# Patient Record
Sex: FEMALE | Race: WHITE | URBAN - METROPOLITAN AREA
[De-identification: names, ages, dates, MRNs, and addresses within clinical notes are randomized per-mention and may not be internally consistent; named-entity substitution may affect disease eponyms.]

---

## 2017-01-01 ENCOUNTER — HOSPITAL ENCOUNTER (OUTPATIENT)
Dept: CARDIAC CATH/INVASIVE PROCEDURES | Age: 82
Discharge: HOME | End: 2017-11-16
Attending: UROLOGY | Admitting: UROLOGY

## 2017-01-01 ENCOUNTER — TELEPHONE (OUTPATIENT)
Dept: ORTHOPEDIC SURGERY | Age: 82
End: 2017-01-01

## 2017-01-01 ENCOUNTER — OFFICE VISIT (OUTPATIENT)
Dept: ORTHOPEDIC SURGERY | Age: 82
End: 2017-01-01

## 2017-01-01 ENCOUNTER — HOSPITAL ENCOUNTER (OUTPATIENT)
Dept: INTERVENTIONAL RADIOLOGY/VASCULAR | Age: 82
Discharge: HOME | End: 2017-07-28
Attending: OBSTETRICS & GYNECOLOGY | Admitting: OBSTETRICS & GYNECOLOGY

## 2017-01-01 ENCOUNTER — HOSPITAL ENCOUNTER (OUTPATIENT)
Dept: CARDIAC CATH/INVASIVE PROCEDURES | Age: 82
Discharge: HOME | End: 2017-09-22
Attending: UROLOGY | Admitting: UROLOGY

## 2017-01-01 ENCOUNTER — HOSPITAL ENCOUNTER (OUTPATIENT)
Dept: CT IMAGING | Age: 82
Discharge: HOME | End: 2017-07-13
Attending: OBSTETRICS & GYNECOLOGY | Admitting: OBSTETRICS & GYNECOLOGY

## 2017-01-01 ENCOUNTER — PRE-PROCEDURE TELEPHONE (OUTPATIENT)
Dept: INTERVENTIONAL RADIOLOGY/VASCULAR | Age: 82
End: 2017-01-01

## 2017-01-01 ENCOUNTER — HOSPITAL ENCOUNTER (OUTPATIENT)
Dept: CARDIAC CATH/INVASIVE PROCEDURES | Age: 82
Discharge: HOME | End: 2017-11-20
Admitting: UROLOGY

## 2017-01-01 ENCOUNTER — HOSPITAL ENCOUNTER (OUTPATIENT)
Dept: SURGERY | Age: 82
Discharge: HOME | End: 2017-11-20
Attending: RADIOLOGY | Admitting: RADIOLOGY

## 2017-01-01 ENCOUNTER — HOSPITAL ENCOUNTER (OUTPATIENT)
Dept: SURGERY | Age: 82
Discharge: HOME | End: 2017-11-10
Attending: UROLOGY | Admitting: UROLOGY

## 2017-01-01 ENCOUNTER — HOSPITAL ENCOUNTER (OUTPATIENT)
Dept: CARDIAC CATH/INVASIVE PROCEDURES | Age: 82
Discharge: HOME | End: 2017-09-26
Attending: RADIOLOGY | Admitting: RADIOLOGY

## 2017-01-01 VITALS
TEMPERATURE: 98.2 F | WEIGHT: 100 LBS | HEIGHT: 60 IN | SYSTOLIC BLOOD PRESSURE: 151 MMHG | HEART RATE: 72 BPM | BODY MASS INDEX: 19.63 KG/M2 | RESPIRATION RATE: 14 BRPM | OXYGEN SATURATION: 100 % | DIASTOLIC BLOOD PRESSURE: 72 MMHG

## 2017-01-01 VITALS — WEIGHT: 100 LBS | HEIGHT: 60 IN | BODY MASS INDEX: 19.63 KG/M2 | TEMPERATURE: 97.6 F

## 2017-01-01 VITALS
SYSTOLIC BLOOD PRESSURE: 150 MMHG | WEIGHT: 100 LBS | HEART RATE: 65 BPM | HEIGHT: 60 IN | DIASTOLIC BLOOD PRESSURE: 75 MMHG | BODY MASS INDEX: 19.63 KG/M2 | OXYGEN SATURATION: 98 % | RESPIRATION RATE: 16 BRPM | TEMPERATURE: 98.1 F

## 2017-01-01 VITALS — WEIGHT: 100 LBS | BODY MASS INDEX: 19.63 KG/M2 | HEIGHT: 60 IN

## 2017-01-01 VITALS — HEIGHT: 60 IN | WEIGHT: 100 LBS | BODY MASS INDEX: 19.63 KG/M2 | TEMPERATURE: 98 F

## 2017-01-01 VITALS
BODY MASS INDEX: 19.63 KG/M2 | DIASTOLIC BLOOD PRESSURE: 75 MMHG | WEIGHT: 100 LBS | SYSTOLIC BLOOD PRESSURE: 119 MMHG | HEIGHT: 60 IN

## 2017-01-01 DIAGNOSIS — Q62.11 HYDRONEPHROSIS WITH URETEROPELVIC JUNCTION (UPJ) OBSTRUCTION: ICD-10-CM

## 2017-01-01 DIAGNOSIS — N13.5 URETERAL OBSTRUCTION: ICD-10-CM

## 2017-01-01 DIAGNOSIS — M79.661 PAIN OF RIGHT LOWER LEG: ICD-10-CM

## 2017-01-01 DIAGNOSIS — M84.361A STRESS FRACTURE OF RIGHT TIBIA, INITIAL ENCOUNTER: Primary | ICD-10-CM

## 2017-01-01 DIAGNOSIS — C67.9 HYDRONEPHROSIS DUE TO OBSTRUCTIVE MALIGNANT BLADDER CANCER (HCC): ICD-10-CM

## 2017-01-01 DIAGNOSIS — R93.89 ABNORMAL X-RAY EXAMINATION: ICD-10-CM

## 2017-01-01 DIAGNOSIS — N13.30 HYDRONEPHROSIS, UNSPECIFIED HYDRONEPHROSIS TYPE: ICD-10-CM

## 2017-01-01 DIAGNOSIS — R59.0 LOCALIZED ENLARGED LYMPH NODES: ICD-10-CM

## 2017-01-01 DIAGNOSIS — M86.9 PERIOSTITIS (HCC): ICD-10-CM

## 2017-01-01 DIAGNOSIS — Z85.51 HISTORY OF BLADDER CANCER: ICD-10-CM

## 2017-01-01 DIAGNOSIS — N13.30 HYDRONEPHROSIS DUE TO OBSTRUCTIVE MALIGNANT BLADDER CANCER (HCC): ICD-10-CM

## 2017-01-01 DIAGNOSIS — C67.8 MALIGNANT NEOPLASM OF OVERLAPPING SITES OF BLADDER (HCC): Primary | ICD-10-CM

## 2017-01-01 DIAGNOSIS — C67.9 MALIGNANT NEOPLASM OF URINARY BLADDER, UNSPECIFIED SITE (HCC): ICD-10-CM

## 2017-01-01 DIAGNOSIS — R59.9 PALPABLE LYMPH NODE: ICD-10-CM

## 2017-01-01 DIAGNOSIS — R59.0 INGUINAL LYMPHADENOPATHY: ICD-10-CM

## 2017-01-01 LAB
ANION GAP SERPL CALCULATED.3IONS-SCNC: 13 MMOL/L (ref 3–16)
BASOPHILS ABSOLUTE: 0 K/UL (ref 0–0.2)
BASOPHILS RELATIVE PERCENT: 0.5 %
BUN BLDV-MCNC: 25 MG/DL (ref 7–20)
CALCIUM SERPL-MCNC: 9.3 MG/DL (ref 8.3–10.6)
CHLORIDE BLD-SCNC: 103 MMOL/L (ref 99–110)
CO2: 24 MMOL/L (ref 21–32)
CREAT SERPL-MCNC: 1.2 MG/DL (ref 0.6–1.2)
EOSINOPHILS ABSOLUTE: 0.2 K/UL (ref 0–0.6)
EOSINOPHILS RELATIVE PERCENT: 2.8 %
GFR AFRICAN AMERICAN: 51
GFR NON-AFRICAN AMERICAN: 43
GLUCOSE BLD-MCNC: 99 MG/DL (ref 70–99)
HCT VFR BLD CALC: 32.4 % (ref 36–48)
HCT VFR BLD CALC: 34.2 % (ref 36–48)
HEMOGLOBIN: 11 G/DL (ref 12–16)
HEMOGLOBIN: 11.2 G/DL (ref 12–16)
INR BLD: 0.99 (ref 0.85–1.15)
INR BLD: 0.99 (ref 0.85–1.15)
LYMPHOCYTES ABSOLUTE: 1.2 K/UL (ref 1–5.1)
LYMPHOCYTES RELATIVE PERCENT: 19.2 %
MCH RBC QN AUTO: 30.4 PG (ref 26–34)
MCH RBC QN AUTO: 30.4 PG (ref 26–34)
MCHC RBC AUTO-ENTMCNC: 32.8 G/DL (ref 31–36)
MCHC RBC AUTO-ENTMCNC: 34 G/DL (ref 31–36)
MCV RBC AUTO: 89.3 FL (ref 80–100)
MCV RBC AUTO: 92.8 FL (ref 80–100)
MONOCYTES ABSOLUTE: 0.6 K/UL (ref 0–1.3)
MONOCYTES RELATIVE PERCENT: 9.1 %
NEUTROPHILS ABSOLUTE: 4.4 K/UL (ref 1.7–7.7)
NEUTROPHILS RELATIVE PERCENT: 68.4 %
PDW BLD-RTO: 14.1 % (ref 12.4–15.4)
PDW BLD-RTO: 15.9 % (ref 12.4–15.4)
PLATELET # BLD: 255 K/UL (ref 135–450)
PLATELET # BLD: 262 K/UL (ref 135–450)
PMV BLD AUTO: 7.6 FL (ref 5–10.5)
PMV BLD AUTO: 7.9 FL (ref 5–10.5)
POTASSIUM SERPL-SCNC: 4.8 MMOL/L (ref 3.5–5.1)
PROTHROMBIN TIME: 11.2 SEC (ref 9.6–13)
PROTHROMBIN TIME: 11.2 SEC (ref 9.6–13)
RBC # BLD: 3.63 M/UL (ref 4–5.2)
RBC # BLD: 3.69 M/UL (ref 4–5.2)
SODIUM BLD-SCNC: 140 MMOL/L (ref 136–145)
WBC # BLD: 5.4 K/UL (ref 4–11)
WBC # BLD: 6.5 K/UL (ref 4–11)

## 2017-01-01 PROCEDURE — L4387 NON-PNEUM WALK BOOT PRE OTS: HCPCS | Performed by: INTERNAL MEDICINE

## 2017-01-01 PROCEDURE — 73590 X-RAY EXAM OF LOWER LEG: CPT | Performed by: INTERNAL MEDICINE

## 2017-01-01 PROCEDURE — 99215 OFFICE O/P EST HI 40 MIN: CPT | Performed by: INTERNAL MEDICINE

## 2017-01-01 RX ORDER — CIPROFLOXACIN 2 MG/ML
400 INJECTION, SOLUTION INTRAVENOUS ONCE
Status: COMPLETED | OUTPATIENT
Start: 2017-01-01 | End: 2017-01-01

## 2017-01-01 RX ORDER — LIDOCAINE 50 MG/G
OINTMENT TOPICAL
Qty: 30 G | Refills: 1 | Status: SHIPPED | OUTPATIENT
Start: 2017-01-01 | End: 2017-01-01 | Stop reason: ALTCHOICE

## 2017-01-01 RX ORDER — LIDOCAINE HYDROCHLORIDE 10 MG/ML
5 INJECTION, SOLUTION EPIDURAL; INFILTRATION; INTRACAUDAL; PERINEURAL ONCE
Status: COMPLETED | OUTPATIENT
Start: 2017-01-01 | End: 2017-01-01

## 2017-01-01 RX ORDER — CIPROFLOXACIN 2 MG/ML
400 INJECTION, SOLUTION INTRAVENOUS ONCE
Status: DISCONTINUED | OUTPATIENT
Start: 2017-01-01 | End: 2017-01-01 | Stop reason: HOSPADM

## 2017-01-01 RX ORDER — ACETAMINOPHEN 325 MG/1
650 TABLET ORAL EVERY 4 HOURS PRN
Status: DISCONTINUED | OUTPATIENT
Start: 2017-01-01 | End: 2017-01-01 | Stop reason: HOSPADM

## 2017-01-01 RX ORDER — CHOLECALCIFEROL (VITAMIN D3) 125 MCG
500 CAPSULE ORAL DAILY
COMMUNITY
End: 2018-01-01 | Stop reason: CLARIF

## 2017-01-01 RX ORDER — ONDANSETRON 2 MG/ML
4 INJECTION INTRAMUSCULAR; INTRAVENOUS ONCE
Status: COMPLETED | OUTPATIENT
Start: 2017-01-01 | End: 2017-01-01

## 2017-01-01 RX ORDER — BACITRACIN, NEOMYCIN, POLYMYXIN B 400; 3.5; 5 [USP'U]/G; MG/G; [USP'U]/G
OINTMENT TOPICAL ONCE
Status: COMPLETED | OUTPATIENT
Start: 2017-01-01 | End: 2017-01-01

## 2017-01-01 RX ADMIN — BACITRACIN, NEOMYCIN, POLYMYXIN B: 400; 3.5; 5 OINTMENT TOPICAL at 09:55

## 2017-01-01 RX ADMIN — CIPROFLOXACIN 400 MG: 2 INJECTION, SOLUTION INTRAVENOUS at 12:04

## 2017-01-01 RX ADMIN — LIDOCAINE HYDROCHLORIDE 5 ML: 10 INJECTION, SOLUTION EPIDURAL; INFILTRATION; INTRACAUDAL; PERINEURAL at 09:35

## 2017-01-01 RX ADMIN — ONDANSETRON 4 MG: 2 INJECTION INTRAMUSCULAR; INTRAVENOUS at 17:13

## 2017-01-01 RX ADMIN — CIPROFLOXACIN 400 MG: 2 INJECTION, SOLUTION INTRAVENOUS at 11:59

## 2017-01-01 ASSESSMENT — PAIN - FUNCTIONAL ASSESSMENT: PAIN_FUNCTIONAL_ASSESSMENT: 0-10

## 2017-01-01 ASSESSMENT — PAIN SCALES - GENERAL: PAINLEVEL_OUTOF10: 0

## 2017-06-09 PROBLEM — Z71.2 ENCOUNTER TO DISCUSS TEST RESULTS: Status: ACTIVE | Noted: 2017-01-01

## 2017-06-09 PROBLEM — C79.82 SECONDARY MALIGNANT NEOPLASM OF VAGINA (HCC): Status: ACTIVE | Noted: 2017-01-01

## 2017-06-09 PROBLEM — Z92.21 HISTORY OF ANTINEOPLASTIC CHEMOTHERAPY: Status: ACTIVE | Noted: 2017-01-01

## 2017-06-09 PROBLEM — C67.8 MALIGNANT NEOPLASM OF OVERLAPPING SITES OF BLADDER (HCC): Status: ACTIVE | Noted: 2017-01-01

## 2017-10-17 PROBLEM — M79.661 PAIN OF RIGHT LOWER LEG: Status: ACTIVE | Noted: 2017-01-01

## 2017-10-17 PROBLEM — M86.9 PERIOSTITIS (HCC): Status: ACTIVE | Noted: 2017-01-01

## 2017-10-17 PROBLEM — R93.89 ABNORMAL X-RAY EXAMINATION: Status: ACTIVE | Noted: 2017-01-01

## 2017-10-17 PROBLEM — M84.361A STRESS FRACTURE OF RIGHT TIBIA: Status: ACTIVE | Noted: 2017-01-01

## 2017-10-17 NOTE — PROGRESS NOTES
Chief Complaint:   Chief Complaint   Patient presents with    Leg Pain     New. R. ankle/shin pain          History of Present Illness:       Patient is a 80 y.o. female presents with the above complaint. The symptoms began 1 yearsago and started with an injury. The patient describes a aching, healing pain that does not radiate. The symptoms are intermittent  and are are worsening since the onset. She reports that her symptoms of leg pain and ankle pain relate to an acute injury sustained approximately year ago when she apparently sustained an ankle sprain. X-rays were done at that time which are outlined below and compared to current x-rays obtained today. She notes sensitivity over the anterior aspect of shin as an additional complaint. She rates the pain as 5/10 severity in general she localizes the majority of pain about the lateral malleolar region and her symptoms are worsened with walking. The lower extremity swelling has been constant since her injury a year ago per her report she has had no vascular studies that she is aware of specifically venous Doppler studies. Her symptoms do not seem to follow a vascular claudication pattern. Of significance she's been recently diagnosed with recurrence of bladder cancer and is expected to undergo XRT. She was recently hospitalized for urinary tract obstruction status post nephrostomy placement.   No progression has no history of metastatic disease or pathologic fractures     Past Medical History:        Past Medical History:   Diagnosis Date    [facility] [facility]     Cancer ([facility] [address]     Closed fracture of rib of right side 06/2016    5th rib    Fall at home 06/29/2016    Fracture of clavicle, closed 06/29/2016    distal    Pneumothorax, closed, traumatic 06/29/2016    R, apical- Woodroe Jewel off of her deck while watering plants         Past Surgical History:   Procedure Laterality Date    BLADDER REMOVAL      COLECTOMY           Present Medications: Current Outpatient Prescriptions   Medication Sig Dispense Refill    lidocaine (XYLOCAINE) 5 % ointment Apply up to  5 g to affected area twice a day to three times a day 30 g 1    zolpidem (AMBIEN) 5 MG tablet Take 5 mg by mouth nightly as needed for Sleep      LORazepam (ATIVAN) 0.5 MG tablet Take 0.5 mg by mouth every 6 hours as needed for Anxiety      diphenoxylate-atropine (LOMOTIL) 2.5-0.025 MG per tablet Take 1 tablet by mouth 4 times daily as needed for Diarrhea      Lactobacillus (PROBIOTIC ACIDOPHILUS) TABS Take 1 tablet by mouth 2 times daily 30 tablet 0    ondansetron (ZOFRAN ODT) 4 MG disintegrating tablet Take 1 tablet by mouth every 8 hours as needed for Nausea 20 tablet 0     No current facility-administered medications for this visit. Allergies:      No Known Allergies     Social History:         Social History     Social History    Marital status:      Spouse name: N/A    Number of children: N/A    Years of education: N/A     Occupational History    Not on file. Social History Main Topics    Smoking status: Former Smoker    Smokeless tobacco: Not on file    Alcohol use 0.0 oz/week      Comment: occ    Drug use: No    Sexual activity: Not on file     Other Topics Concern    Not on file     Social History Narrative    No narrative on file        Review of Symptoms:    Pertinent items are noted in HPI    Review of systems reviewed from Patient History Form dated on today's date and   available in the patient's chart under the Media tab. Vital Signs:      Vitals:    10/17/17 1354   BP: 119/75        General Exam:     Constitutional: Patient is adequately groomed with no evidence of malnutrition  Mental Status: The patient is oriented to time, place and person. The patient's mood and affect are appropriate. Vascular: Examination reveals no swelling or calf tenderness. Peripheral pulses are palpable and 2+.     Lymphatics: no lymphadenopathy of the inguinal region or lower extremity      Physical Exam: right leg/ankle      Primary Exam:    Inspection:  There is mild asymmetric swelling of the right lower extremity, no deformity atrophy      Palpation:  There is focal tenderness over the anterior cortex mid tibia, there is focal tenderness that reproduces her pain over the distal tibial shaft without crepitation no focal tenderness over the lateral ligamentous complex of the ankle      Range of Motion:  Symmetric at the ankle without pain      Strength:  Normal at the ankle without pain      Special Tests:  Single leg stance with mild increase of pain, lateral ligamentous testing stable both the ankle      Skin: There are no rashes, ulcerations or lesions. Gait: near normal      Reflex intact lower     Additional Comments:        Additional Examinations:           Left Lower Extremity: Examination of the left lower extremity does not show any tenderness, deformity or injury. Range of motion is unremarkable. There is no gross instability. There are no rashes, ulcerations or lesions. Strength and tone are normal.   Neurolgic -Light touch sensation and manual muscle testing normal L2-S1. No fasiculations. Pattella tendon and Achilles tendon reflexes +2 bilaterally. Seated SLR negative          Office Imaging Results/Procedures PerformedToday:      Radiology:      X-rays obtained and reviewed in office:   Views 2 views   Location tib-fib   Impression there is evidence of patchy sclerosis along the mid shaft and proximal shaft of the tibia suggestive of medullary/intramedullary process. No discrete fracture no discrete expansile change or destructive changes.   There is also patchy sclerotic change in the region of the distal fibula at the junction of middle and distal third and some cortical regularity along the posterior cortical margin of the distal fibula which may relate to periosteal reaction and stress injury ankle mortise is anatomic more proximal Disclaimer: \"This note was dictated with voice recognition software. Though review and correction are routine, we apologize for any errors. \"

## 2017-11-08 NOTE — PROGRESS NOTES
[facility] / [facility] ([facility]) [address] [facility], [address]    Acknowledgment of Informed Consent for Surgical or Medical Procedure and Sedation  I agree to allow doctor(s) Nicanor Aquino and his/her associates or assistants, including residents and/or other qualified medical practitioner to perform the following medical treatment or procedure and to administer or direct the administration of sedation as necessary:  Procedure(s): LOOP ENDOSCOPY LEFT URETERAL STENT CHANGE (CONVERSION OF URETERAL STENT TO SINGLE J EXTERNALIZED STENT)  My doctor has explained the following regarding the proposed procedure:   the explanation of the procedure   the benefits of the procedure   the potential problems that might occur during recuperation   the risks and side effects of the procedure which could include but are not limited to severe blood loss, infection, stroke or death   the benefits, risks and side effect of alternative procedures including the consequences of declining this procedure or any alternative procedures   the likelihood of achieving satisfactory results. I acknowledge no guarantee or assurance has been made to me regarding the results. I understand that during the course of this treatment/procedure, unforeseen conditions can occur which require an additional or different procedure. I agree to allow my physician or assistants to perform such extension of the original procedure as they may find necessary. I understand that sedation will often result in temporary impairment of memory and fine motor skills and that sedation can occasionally progress to a state of deep sedation or general anesthesia. I understand the risks of anesthesia for surgery include, but are not limited to, sore throat, hoarseness, injury to face, mouth, or teeth; nausea; headache; injury to blood vessels or nerves; death, brain damage, or paralysis.     I understand that if I have a Limitation of Treatment order in effect during my hospitalization, the order may or may not be in effect during this procedure. I give my doctor permission to give me blood or blood products. I understand that there are risks with receiving blood such as hepatitis, AIDS, fever, or allergic reaction. I acknowledge that the risks, benefits, and alternatives of this treatment have been explained to me and that no express or implied warranty has been given by the hospital, any blood bank, or any person or entity as to the blood or blood components transfused. At the discretion of my doctor, I agree to allow observers, equipment/product representatives and allow photographing, and/or televising of the procedure, provided my name or identity is maintained confidentially. I agree the hospital may dispose of or use for scientific or educational purposes any tissue, fluid, or body parts which may be removed.     ________________________________Date________Time______ am/pm  ([employer])  Patient or Signature of Closest Relative or Legal Guardian    ________________________________Date________Time______am/pm      Page 1 of  1  Witness

## 2017-11-10 NOTE — OP NOTE
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 POSTOPERATIVE DIAGNOSES:  Recurrent metastatic bladder cancer, left  ureteral stricture to ileal conduit. PROCEDURE PERFORMED:  Loop endoscopy of ileal conduit and loopogram.    INDICATIONS FOR PROCEDURE:  The patient is a pleasant 80-year-old female  with advanced bladder cancer. Briefly, she has a long history of a  cystectomy and ileal conduit, back in . She represented to me with  vaginal biopsy showing poorly differentiated carcinoma and PET CT had  multiple abnormalities. She was treated with pelvic radiation and  cisplatin chemotherapy and referred to Dr. Frank Lambert with [facility]. She then  developed a left ureteral stricture and had a left nephrostomy tube placed,  this was converted to a double-J stent. Unfortunately, stent is not  externalized and this is simple double-J stent. As this was placed on  , she is here for a stent exchange and she is having some flank  discomfort. DESCRIPTION OF PROCEDURE:  After informed consent, the patient was taken to  the operating room. Under local anesthesia, I placed a flexible cystoscope  into the ileal conduit. I am able to get only about 4 cm proximally, but  can advance no further. I then used a wire to try to straighten out the  conduit, however, there is either a stenotic area or a diverticulum that is  not allowing me to bypass this area.   To get a better look, I then placed a  16-Macedonian catheter into the conduit and did a loopogram.  She has a very  tortous conduit and contrast is able to get through the level of the stent. I then tried for several more minutes to manipulate the conduit to get more  proximally. The patient tolerated the procedure well, but in the end, I  was never more than 8 cm close to the actual double-J stent. Therefore,  the procedure is ended and the patient is taken to the recovery room in  stable condition. PLAN:  We are going to have to have interventional radiology consider doing  a percutaneous approach to see if they can get a wire to go antegrade.         Nani Barry MD    D: 11/10/2017 8:59:07       T: 11/10/2017 11:30:44     JUAN [email or web address]  Job#: [id number]     Doc#: [id number]

## 2017-11-10 NOTE — H&P
Mayra Avilesverónica    [id number]      Department of General Surgery    Surgical Services     Pre-operative History and Physical      INDICATION:   BLADDER CANCER, HYDRONEPHROSIS    PROCEDURE:  Loop Endoscopy Left Ureteral Stent Change ( Conversion Of Ureteral Stent To Single J Externalized Stent. CHIEF COMPLAINT:  Pt. Is a 80 y.o. Female who is here for surgical intervention for Loop Endoscopy Left Ureteral Stent Change ( Conversion Of Ureteral Stent To Single J Externalized Stent To assist with Bladder Cancer and Hydronephrosis treatment. Patient Active Problem List   Diagnosis    Excessive cerumen in ear canal    Pneumothorax    Closed fracture of multiple ribs of right side    Fall from other slipping, tripping, or stumbling    Pneumothorax, closed, traumatic    Fracture of clavicle, closed    Fall at home    Closed fracture of rib of right side    Malignant neoplasm of overlapping sites of bladder ([address]    Secondary malignant neoplasm of vagina ([address]    History of antineoplastic chemotherapy    Encounter to discuss test results    Stress fracture of right tibia    Abnormal x-ray examination    Pain of right lower leg    Periostitis ([address]       HISTORY: Please See Below    Weight loss:  No    Hx Steroid use: No    Past Medical History:        Diagnosis Date    Bladder cancer ([address]     Cancer ([address]     Closed fracture of rib of right side 06/2016    5th rib    Fall at home 06/29/2016    Fracture of clavicle, closed 06/29/2016    distal    Pneumothorax, closed, traumatic 06/29/2016    R, apical- Allyson Quigley off of her deck while watering plants     Past Surgical History:        Procedure Laterality Date    BLADDER REMOVAL      COLECTOMY         Medications Prior to Admission:   Prior to Admission medications    Medication Sig Start Date End Date Taking?  Authorizing Provider   LORazepam (ATIVAN) 0.5 MG tablet Take 0.5 mg by mouth every 6 hours as needed for Anxiety   Yes Historical Provider, MD   lidocaine (XYLOCAINE) 5 % ointment Apply up to  5 g to affected area twice a day to three times a day 10/17/17   Marisol Nickerson MD   zolpidem (AMBIEN) 5 MG tablet Take 5 mg by mouth nightly as needed for Sleep    Historical Provider, MD   diphenoxylate-atropine (LOMOTIL) 2.5-0.025 MG per tablet Take 1 tablet by mouth 4 times daily as needed for Diarrhea    Historical Provider, MD   Lactobacillus (PROBIOTIC ACIDOPHILUS) TABS Take 1 tablet by mouth 2 times daily 7/5/16   Jazz Hernandez MD   ondansetron (ZOFRAN ODT) 4 MG disintegrating tablet Take 1 tablet by mouth every 8 hours as needed for Nausea 7/5/16   Jazz Hernandez MD       Allergies:  Review of patient's allergies indicates no known allergies.     History of allergic reaction to anesthesia:  No    Social History:   Social History     Social History    Marital status:      Spouse name: N/A    Number of children: N/A    Years of education: N/A     Social History Main Topics    Smoking status: Former Smoker    Smokeless tobacco: Not on file    Alcohol use 0.0 oz/week      Comment: occ    Drug use: No    Sexual activity: Not on file     Other Topics Concern    Not on file     Social History Narrative    No narrative on file         Family History:       Problem Relation Age of Onset    Breast Cancer Sister 39    Ovarian Cancer Sister 67    Breast Cancer Other 45         REVIEW OF SYSTEMS:    CONSTITUTIONAL:No fevers, chills, sweats, fatigue and malaise  RESPIRATORY:  no respiratory symptoms  CARDIOVASCULAR: No chest pain, dyspnea, palpitations, orthopnea, PND  GASTROINTESTINAL:No  nausea, vomiting, change in bowel habits, diarrhea, constipation and abdominal pain  GENITOURINARY:  negative  INTEGUMENT/BREAST:  negative  MUSCULOSKELETAL: No myalgias, arthralgias, pain, joint swelling and stiff joints  NEUROLOGICAL:  alert, oriented, normal speech, no focal findings or movement disorder noted    PHYSICAL EXAM:      BP 125/80   Pulse 72   Temp 97.7 °F (36.5 °C) (Oral)   Resp 16   Ht 5' (1.524 m)   Wt 100 lb (45.4 kg)   SpO2 99%   BMI 19.53 kg/m²  I      Eyes:  pupils equal, round and reactive to light and conjunctiva normal    Head/ENT:  Normocephalic, without obvious abnormality, atramatic, oral pharynx with moist mucus membranes, tonsils without erythema or exudates, gums normal and good dentition. Neck:  Full ROM, supple, symmetrical, trachea midline, no adenopathy, thyroid symmetric, not enlarged and no tenderness, skin warm and dry. Heart: regular rate and rhythm,no murmur     Lungs:  No increased work of breathing, good air exchange, clear to auscultation bilaterally, no crackles or wheezing    Abdomen:  Normal bowel sounds, soft, non-distended, non-tender, no masses palpated    Extremities:  No clubbing, cyanosis, or edema      ASSESSMENT AND PLAN:    1. Patient is a 80 y.o. female with above specified procedure planned. As of note, patient was  In hospital for UTI in beginning of October. 2.  Access to ancillary services are available per request of the provider.     Maggy Davenport   11/10/2017

## 2017-11-10 NOTE — BRIEF OP NOTE
Brief Postoperative Note    Minerva Rod  YOB: 1931  [id number]    Pre-operative Diagnosis: LEFT URETERAL STRICTURE    Post-operative Diagnosis: Same    Procedure: LOOP ENDOSCOPY, LOOPAGRAM, FAILED STENT CONVERSION TO SINGLE J STENT    Anesthesia: Local    Surgeons/Assistants: SPSUT    Estimated Blood Loss: less than 50     Complications: Other: UNABLE TO VISUALIZE STENT IN CONDUIT.     Specimens: Was Not Obtained    Findings: UNABLE TO REACH STENT FOR STENT EXCHANGE  NEED IR TO PERCUTANEOUSLY EXCHANGE    Electronically signed by Ally Mejía MD on 11/10/2017 at 8:58 AM

## 2017-11-17 NOTE — H&P
Sedation Pre-Procedure Note    Patient Name: Paul Covarrubias   YOB: 1931  Room/Bed: Room/bed info not found  Medical Record Number: [id number]  Date: 11/17/2017   Time: 2:19 PM       Indication:  Pre sedation    Consent: I have discussed with the patient and/or the patient representative the indication, alternatives, and the possible risks and/or complications of the planned procedure and the anesthesia methods. The patient and/or patient representative appear to understand and agree to proceed. Vital Signs: There were no vitals filed for this visit. Past Medical History:   has a past medical history of Bladder cancer ([facility] [address] Cancer ([facility] [address] Closed fracture of rib of right side; Fall at home; Fracture of clavicle, closed; and Pneumothorax, closed, traumatic. Past Surgical History:   has a past surgical history that includes Bladder removal and colectomy. Medications:   Scheduled Meds:   Continuous Infusions:   PRN Meds:   Home Meds:   Prior to Admission medications    Medication Sig Start Date End Date Taking?  Authorizing Provider   vitamin B-12 (CYANOCOBALAMIN) 500 MCG tablet Take 500 mcg by mouth daily Unsure of dosage   Yes Historical Provider, MD   zolpidem (AMBIEN) 5 MG tablet Take 5 mg by mouth nightly as needed for Sleep   Yes Historical Provider, MD   LORazepam (ATIVAN) 0.5 MG tablet Take 0.5 mg by mouth every 6 hours as needed for Anxiety   Yes Historical Provider, MD   diphenoxylate-atropine (LOMOTIL) 2.5-0.025 MG per tablet Take 1 tablet by mouth 4 times daily as needed for Diarrhea   Yes Historical Provider, MD   Lactobacillus (PROBIOTIC ACIDOPHILUS) TABS Take 1 tablet by mouth 2 times daily 7/5/16  Yes Lorelei Hennessy MD   ondansetron (ZOFRAN ODT) 4 MG disintegrating tablet Take 1 tablet by mouth every 8 hours as needed for Nausea 7/5/16  Yes Lorelei Hennessy MD     Coumadin Use Last 7 Days:  no  Antiplatelet drug therapy use last 7 days: no  Other anticoagulant use last 7 days: no  Additional Medication Information:  n/a      Pre-Sedation Documentation and Exam:   I have reviewed the patient's history and review of systems.     Mallampati Airway Assessment:  Mallampati Class II - (soft palate, fauces & uvula are visible)    Prior History of Anesthesia Complications:   none    ASA Classification:  Class 2 - A normal healthy patient with mild systemic disease    Sedation/ Anesthesia Plan:   intravenous sedation    Medications Planned:   midazolam (Versed) intravenously and fentanyl intravenously    Patient is an appropriate candidate for plan of sedation: yes    Electronically signed by Nino Gonzalez MD on 11/17/2017 at 2:19 PM

## 2017-11-17 NOTE — PRE-PROCEDURE INSTRUCTIONS
Pre procedure phone call complete. Instructions given to  re arrival time of 1030, npo after midnight sunday, to take morning meds with a small amt water, to bring a current list of meds with dosages and to have someone available to drive her home and stay the next 24 hours with her.  verbalized understanding.

## 2018-01-01 ENCOUNTER — HOSPITAL ENCOUNTER (OUTPATIENT)
Dept: CARDIAC CATH/INVASIVE PROCEDURES | Age: 83
Discharge: HOME | End: 2018-02-01
Attending: UROLOGY | Admitting: UROLOGY

## 2018-01-01 ENCOUNTER — HOSPITAL ENCOUNTER (OUTPATIENT)
Dept: SURGERY | Age: 83
Discharge: HOME | End: 2018-01-31
Attending: UROLOGY | Admitting: UROLOGY

## 2018-01-01 ENCOUNTER — HOSPITAL ENCOUNTER (OUTPATIENT)
Dept: SURGERY | Age: 83
Discharge: HOME | End: 2018-01-29
Attending: UROLOGY | Admitting: UROLOGY

## 2018-01-01 VITALS
TEMPERATURE: 97.6 F | RESPIRATION RATE: 16 BRPM | OXYGEN SATURATION: 99 % | SYSTOLIC BLOOD PRESSURE: 136 MMHG | WEIGHT: 85 LBS | BODY MASS INDEX: 16.69 KG/M2 | HEIGHT: 60 IN | DIASTOLIC BLOOD PRESSURE: 79 MMHG | HEART RATE: 85 BPM

## 2018-01-01 VITALS — HEIGHT: 60 IN | BODY MASS INDEX: 16.69 KG/M2 | TEMPERATURE: 98.1 F | WEIGHT: 85 LBS

## 2018-01-01 DIAGNOSIS — N13.5 URETERAL OBSTRUCTION: ICD-10-CM

## 2018-01-01 LAB
ANION GAP SERPL CALCULATED.3IONS-SCNC: 15 MMOL/L (ref 3–16)
BASOPHILS ABSOLUTE: 0 K/UL (ref 0–0.2)
BASOPHILS RELATIVE PERCENT: 0.4 %
BUN BLDV-MCNC: 24 MG/DL (ref 7–20)
CALCIUM SERPL-MCNC: 8.8 MG/DL (ref 8.3–10.6)
CHLORIDE BLD-SCNC: 95 MMOL/L (ref 99–110)
CO2: 23 MMOL/L (ref 21–32)
CREAT SERPL-MCNC: 1.4 MG/DL (ref 0.6–1.2)
EOSINOPHILS ABSOLUTE: 0 K/UL (ref 0–0.6)
EOSINOPHILS RELATIVE PERCENT: 0.5 %
GFR AFRICAN AMERICAN: 43
GFR NON-AFRICAN AMERICAN: 36
GLUCOSE BLD-MCNC: 110 MG/DL (ref 70–99)
HCT VFR BLD CALC: 36.7 % (ref 36–48)
HEMOGLOBIN: 11.6 G/DL (ref 12–16)
INR BLD: 1.01 (ref 0.85–1.15)
LYMPHOCYTES ABSOLUTE: 1 K/UL (ref 1–5.1)
LYMPHOCYTES RELATIVE PERCENT: 14.5 %
MCH RBC QN AUTO: 30.1 PG (ref 26–34)
MCHC RBC AUTO-ENTMCNC: 31.5 G/DL (ref 31–36)
MCV RBC AUTO: 95.4 FL (ref 80–100)
MONOCYTES ABSOLUTE: 1.1 K/UL (ref 0–1.3)
MONOCYTES RELATIVE PERCENT: 16.1 %
NEUTROPHILS ABSOLUTE: 4.6 K/UL (ref 1.7–7.7)
NEUTROPHILS RELATIVE PERCENT: 68.5 %
PDW BLD-RTO: 14.8 % (ref 12.4–15.4)
PLATELET # BLD: 253 K/UL (ref 135–450)
PMV BLD AUTO: 7.1 FL (ref 5–10.5)
POTASSIUM SERPL-SCNC: 4.4 MMOL/L (ref 3.5–5.1)
PROTHROMBIN TIME: 11.4 SEC (ref 9.6–13)
RBC # BLD: 3.85 M/UL (ref 4–5.2)
SODIUM BLD-SCNC: 133 MMOL/L (ref 136–145)
WBC # BLD: 6.7 K/UL (ref 4–11)

## 2018-01-01 RX ORDER — CIPROFLOXACIN 500 MG/1
500 TABLET, FILM COATED ORAL ONCE
Status: COMPLETED | OUTPATIENT
Start: 2018-01-01 | End: 2018-01-01

## 2018-01-01 RX ORDER — GLYCOPYRROLATE 0.2 MG/ML
0.1 INJECTION INTRAMUSCULAR; INTRAVENOUS ONCE
Status: DISCONTINUED | OUTPATIENT
Start: 2018-01-01 | End: 2018-01-01 | Stop reason: HOSPADM

## 2018-01-01 RX ORDER — SODIUM CHLORIDE, SODIUM LACTATE, POTASSIUM CHLORIDE, CALCIUM CHLORIDE 600; 310; 30; 20 MG/100ML; MG/100ML; MG/100ML; MG/100ML
INJECTION, SOLUTION INTRAVENOUS CONTINUOUS
Status: DISCONTINUED | OUTPATIENT
Start: 2018-01-01 | End: 2018-01-01 | Stop reason: HOSPADM

## 2018-01-01 RX ORDER — CIPROFLOXACIN 2 MG/ML
400 INJECTION, SOLUTION INTRAVENOUS ONCE
Status: COMPLETED | OUTPATIENT
Start: 2018-01-01 | End: 2018-01-01

## 2018-01-01 RX ORDER — CIPROFLOXACIN 500 MG/1
500 TABLET, FILM COATED ORAL ONCE
Status: CANCELLED | OUTPATIENT
Start: 2018-01-01

## 2018-01-01 RX ADMIN — CIPROFLOXACIN 400 MG: 2 INJECTION, SOLUTION INTRAVENOUS at 09:44

## 2018-01-01 RX ADMIN — CIPROFLOXACIN 500 MG: 500 TABLET, FILM COATED ORAL at 08:42

## 2018-01-01 ASSESSMENT — PAIN - FUNCTIONAL ASSESSMENT: PAIN_FUNCTIONAL_ASSESSMENT: 0-10

## 2018-01-29 NOTE — PROGRESS NOTES
Discharge instructions given to patient. Verbalized understanding. Patient to follow up with interventional radiology per Dr. Sury Stone.

## 2018-01-29 NOTE — BRIEF OP NOTE
Brief Postoperative Note    Jorge Allen  YOB: 1931  [id number]    Pre-operative Diagnosis: BLADDER CANCER, LEFT URETERAL STRICTURE    Post-operative Diagnosis: Same    Procedure: [facility] AND ENDOSCOPY OF ILEAL CONDUIT    Anesthesia: None    Surgeons/Assistants: HILDA    Estimated Blood Loss: less than 50     Complications: None    Specimens: Was Not Obtained    Findings: I AGAIN COULD NOT GET THE STENT IN THE CONDUIT. THIS WILL EITHER NEED ANOTHER ATTEMPT BY IR TO EXTERNALIZE THE STENT OR SHE WILL HAVE TO LIVE WITH A NEPH TUBE.   I HAVE CONTACTED IR     Electronically signed by Ena Guzman MD on 1/29/2018 at 9:46 AM

## 2018-01-29 NOTE — H&P
García Brazil    [id number]      Department of General Surgery    Surgical Services     Pre-operative History and Physical      INDICATION:   BLADDER CANCER, LEFT HYDRONEPHROS    PROCEDURE:  Cystoscopy Left Stent Change-Patient Has Conduit Will Need Scope Conduit To Exchange Stent    CHIEF COMPLAINT:  Pt. Is a 80 y.o. Frail appearing Female who is here for surgical intervention for Cystoscopy Left Stent Change-Patient Has Conduit Will Need Scope Conduit To Exchange Stent for treatment of Bladder Cancer and for treatment of Left Hydronephrosis. Patient Active Problem List   Diagnosis    Excessive cerumen in ear canal    Pneumothorax    Closed fracture of multiple ribs of right side    Fall from other slipping, tripping, or stumbling    Pneumothorax, closed, traumatic    Fracture of clavicle, closed    Fall at home    Closed fracture of rib of right side    Malignant neoplasm of overlapping sites of bladder ([address]    Secondary malignant neoplasm of vagina ([address]    History of antineoplastic chemotherapy    Encounter to discuss test results    Stress fracture of right tibia    Abnormal x-ray examination    Pain of right lower leg    Periostitis ([address]       HISTORY:   Please See Below    Weight loss:  No    Hx Steroid use: no    Past Medical History:        Diagnosis Date    Bladder cancer ([address]     Cancer ([address]     Closed fracture of rib of right side 06/2016    5th rib    Fall at home 06/29/2016    Fracture of clavicle, closed 06/29/2016    distal    Pneumothorax, closed, traumatic 06/29/2016    R, apical- Hannah Kortney off of her deck while watering plants     Past Surgical History:        Procedure Laterality Date    BLADDER REMOVAL      COLECTOMY         Medications Prior to Admission:   Prior to Admission medications    Medication Sig Start Date End Date Taking?  Authorizing Provider   zolpidem (AMBIEN) 5 MG tablet Take 5 mg by mouth nightly as needed for Sleep   Yes Historical Provider, MD

## 2018-01-30 NOTE — OP NOTE
itself and is way  proximal in the conduit. 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 I cannot make this turn again when I tried with a sensor wire or  when I tried with the camera under direct vision. Therefore, the procedure  was aborted and the patient was taken to the recovery room in stable  condition. PLAN:  We failed again to do a stent exchange under local today. There is  no way for me to access this with my camera from her conduit. I have  spoken with IR again and she basically has two options, either IR can get  the stent to be externalized through the conduit or she will have to live  with a nephrostomy tube. I told her __ doing a loop endoscopy to retrieve the stent is just  not going to be successful from here on out.       Miguel Tiwari MD    D: 01/29/2018 10:30:06       T: 01/29/2018 22:03:12     JUAN [email or web address]  Job#: [id number]     Doc#: [id number]  CC:

## 2018-02-01 PROBLEM — J11.1 INFLUENZA: Status: ACTIVE | Noted: 2018-01-01

## 2018-02-01 PROBLEM — E86.0 DEHYDRATION: Status: ACTIVE | Noted: 2018-01-01

## 2018-02-01 PROBLEM — D64.9 ANEMIA: Status: ACTIVE | Noted: 2018-01-01

## 2018-02-01 NOTE — PLAN OF CARE
reintroduced with satisfactory retrieval of the internal stent. This was retracted with direct fluoroscopy from the stoma. Glidewire was then introduced and with direct fluoroscopy despite the catheter being withdrawn into the distal ureter, successful placement of the glide wire into the renal pelvis of the left kidney was obtained    Subsequent, an 8 [employer] ureteral stent was then positioned satisfactorily in the left renal pelvis and the distal portion remained external within the stoma bag. Extra length was purposely left to allow for peristalsis and the tortuous redundancy of the conduit to recur prior to shortening the external portion of the stent. Complications: None  Estimated blood loss: None  Fluoroscopy time: 32.7 minute  Number of acquired image runs: 21    IMPRESSION:  1. Satisfactory fluoroscopic ileal conduit snaring and removal of displaced ureteral stent. 2. Satisfactory exchange and placement of 8 [place] left ureteral stent.   3. Conscious sedation without complication

## 2018-04-11 PROBLEM — E86.0 DEHYDRATION: Status: RESOLVED | Noted: 2018-01-01 | Resolved: 2018-04-11

## 2018-09-26 PROBLEM — Z71.2 ENCOUNTER TO DISCUSS TEST RESULTS: Status: RESOLVED | Noted: 2017-01-01 | Resolved: 2018-09-26

## 2022-07-09 ENCOUNTER — TELEPHONE ENCOUNTER (OUTPATIENT)
Dept: URBAN - METROPOLITAN AREA CLINIC 121 | Facility: CLINIC | Age: 87
End: 2022-07-09

## 2022-07-09 RX ORDER — CIPROFLOXACIN HCL 500 MG
TABLET ORAL
Refills: 0 | OUTPATIENT
Start: 2013-02-22 | End: 2013-02-22

## 2022-07-10 ENCOUNTER — TELEPHONE ENCOUNTER (OUTPATIENT)
Dept: URBAN - METROPOLITAN AREA CLINIC 121 | Facility: CLINIC | Age: 87
End: 2022-07-10

## 2022-07-10 RX ORDER — CHROMIUM 200 MCG
TABLET ORAL
Refills: 0 | Status: ACTIVE | COMMUNITY
Start: 2013-02-22

## 2022-07-10 RX ORDER — PHENOL 1.4 %
AEROSOL, SPRAY (ML) MUCOUS MEMBRANE
Refills: 0 | Status: ACTIVE | COMMUNITY
Start: 2013-02-22